# Patient Record
Sex: MALE | Race: WHITE | Employment: STUDENT | URBAN - NONMETROPOLITAN AREA
[De-identification: names, ages, dates, MRNs, and addresses within clinical notes are randomized per-mention and may not be internally consistent; named-entity substitution may affect disease eponyms.]

---

## 2024-08-18 ENCOUNTER — HOSPITAL ENCOUNTER (EMERGENCY)
Age: 24
Discharge: HOME | End: 2024-08-19
Attending: EMERGENCY MEDICINE
Payer: OTHER GOVERNMENT

## 2024-08-18 ENCOUNTER — APPOINTMENT (OUTPATIENT)
Dept: GENERAL RADIOLOGY | Age: 24
End: 2024-08-18
Payer: OTHER GOVERNMENT

## 2024-08-18 DIAGNOSIS — R55 NEAR SYNCOPE: ICD-10-CM

## 2024-08-18 DIAGNOSIS — R00.2 PALPITATIONS: Primary | ICD-10-CM

## 2024-08-18 PROCEDURE — 71045 X-RAY EXAM CHEST 1 VIEW: CPT

## 2024-08-18 PROCEDURE — 85379 FIBRIN DEGRADATION QUANT: CPT

## 2024-08-18 PROCEDURE — 80048 BASIC METABOLIC PNL TOTAL CA: CPT

## 2024-08-18 PROCEDURE — 36415 COLL VENOUS BLD VENIPUNCTURE: CPT

## 2024-08-18 PROCEDURE — 84484 ASSAY OF TROPONIN QUANT: CPT

## 2024-08-18 PROCEDURE — 93005 ELECTROCARDIOGRAM TRACING: CPT | Performed by: EMERGENCY MEDICINE

## 2024-08-18 PROCEDURE — 85025 COMPLETE CBC W/AUTO DIFF WBC: CPT

## 2024-08-18 PROCEDURE — 99285 EMERGENCY DEPT VISIT HI MDM: CPT

## 2024-08-18 PROCEDURE — 83735 ASSAY OF MAGNESIUM: CPT

## 2024-08-18 ASSESSMENT — PAIN SCALES - GENERAL: PAINLEVEL_OUTOF10: 3

## 2024-08-18 ASSESSMENT — PAIN - FUNCTIONAL ASSESSMENT: PAIN_FUNCTIONAL_ASSESSMENT: 0-10

## 2024-08-19 ENCOUNTER — APPOINTMENT (OUTPATIENT)
Age: 24
End: 2024-08-19
Attending: EMERGENCY MEDICINE
Payer: OTHER GOVERNMENT

## 2024-08-19 ENCOUNTER — TELEPHONE (OUTPATIENT)
Dept: FAMILY MEDICINE CLINIC | Age: 24
End: 2024-08-19

## 2024-08-19 ENCOUNTER — APPOINTMENT (OUTPATIENT)
Dept: CT IMAGING | Age: 24
End: 2024-08-19
Payer: OTHER GOVERNMENT

## 2024-08-19 VITALS
BODY MASS INDEX: 26.41 KG/M2 | SYSTOLIC BLOOD PRESSURE: 118 MMHG | HEART RATE: 70 BPM | TEMPERATURE: 98.2 F | DIASTOLIC BLOOD PRESSURE: 82 MMHG | HEIGHT: 72 IN | WEIGHT: 195 LBS | OXYGEN SATURATION: 99 % | RESPIRATION RATE: 15 BRPM

## 2024-08-19 LAB
ANION GAP SERPL CALC-SCNC: 12 MEQ/L (ref 8–16)
BASOPHILS ABSOLUTE: 0 THOU/MM3 (ref 0–0.1)
BASOPHILS NFR BLD AUTO: 0.6 %
BUN SERPL-MCNC: 9 MG/DL (ref 7–22)
CALCIUM SERPL-MCNC: 8.9 MG/DL (ref 8.5–10.5)
CHLORIDE SERPL-SCNC: 105 MEQ/L (ref 98–111)
CO2 SERPL-SCNC: 24 MEQ/L (ref 23–33)
CREAT SERPL-MCNC: 0.8 MG/DL (ref 0.4–1.2)
D DIMER PPP IA.FEU-MCNC: 907 NG/ML FEU (ref 0–500)
DEPRECATED RDW RBC AUTO: 38.2 FL (ref 35–45)
ECHO BSA: 2.12 M2
EKG ATRIAL RATE: 86 BPM
EKG P AXIS: 53 DEGREES
EKG P-R INTERVAL: 154 MS
EKG Q-T INTERVAL: 374 MS
EKG QRS DURATION: 100 MS
EKG QTC CALCULATION (BAZETT): 447 MS
EKG R AXIS: 84 DEGREES
EKG T AXIS: 49 DEGREES
EKG VENTRICULAR RATE: 86 BPM
EOSINOPHIL NFR BLD AUTO: 4.2 %
EOSINOPHILS ABSOLUTE: 0.3 THOU/MM3 (ref 0–0.4)
ERYTHROCYTE [DISTWIDTH] IN BLOOD BY AUTOMATED COUNT: 12.1 % (ref 11.5–14.5)
GFR SERPL CREATININE-BSD FRML MDRD: > 90 ML/MIN/1.73M2
GLUCOSE SERPL-MCNC: 107 MG/DL (ref 70–108)
HCT VFR BLD AUTO: 37.7 % (ref 42–52)
HGB BLD-MCNC: 13.3 GM/DL (ref 14–18)
IMM GRANULOCYTES # BLD AUTO: 0.01 THOU/MM3 (ref 0–0.07)
IMM GRANULOCYTES NFR BLD AUTO: 0.2 %
LYMPHOCYTES ABSOLUTE: 1.9 THOU/MM3 (ref 1–4.8)
LYMPHOCYTES NFR BLD AUTO: 29.8 %
MAGNESIUM SERPL-MCNC: 2 MG/DL (ref 1.6–2.4)
MCH RBC QN AUTO: 30.7 PG (ref 26–33)
MCHC RBC AUTO-ENTMCNC: 35.3 GM/DL (ref 32.2–35.5)
MCV RBC AUTO: 87.1 FL (ref 80–94)
MONOCYTES ABSOLUTE: 0.6 THOU/MM3 (ref 0.4–1.3)
MONOCYTES NFR BLD AUTO: 8.6 %
NEUTROPHILS ABSOLUTE: 3.6 THOU/MM3 (ref 1.8–7.7)
NEUTROPHILS NFR BLD AUTO: 56.6 %
NRBC BLD AUTO-RTO: 0 /100 WBC
OSMOLALITY SERPL CALC.SUM OF ELEC: 280.4 MOSMOL/KG (ref 275–300)
PLATELET # BLD AUTO: 320 THOU/MM3 (ref 130–400)
PMV BLD AUTO: 10.4 FL (ref 9.4–12.4)
POTASSIUM SERPL-SCNC: 3.6 MEQ/L (ref 3.5–5.2)
RBC # BLD AUTO: 4.33 MILL/MM3 (ref 4.7–6.1)
SODIUM SERPL-SCNC: 141 MEQ/L (ref 135–145)
TROPONIN, HIGH SENSITIVITY: < 6 NG/L (ref 0–12)
WBC # BLD AUTO: 6.4 THOU/MM3 (ref 4.8–10.8)

## 2024-08-19 PROCEDURE — 93242 EXT ECG>48HR<7D RECORDING: CPT

## 2024-08-19 PROCEDURE — 93010 ELECTROCARDIOGRAM REPORT: CPT | Performed by: INTERNAL MEDICINE

## 2024-08-19 PROCEDURE — 6360000004 HC RX CONTRAST MEDICATION: Performed by: EMERGENCY MEDICINE

## 2024-08-19 PROCEDURE — 71275 CT ANGIOGRAPHY CHEST: CPT

## 2024-08-19 RX ADMIN — IOPAMIDOL 80 ML: 755 INJECTION, SOLUTION INTRAVENOUS at 01:20

## 2024-08-19 NOTE — ED PROVIDER NOTES
I performed a history and physical examination of the patient and discussed management with the resident. I reviewed the resident’s note and agree with the documented findings and plan of care. Any areas of disagreement are noted on the chart. I was personally present for the key portions of any procedures. I have documented in the chart those procedures where I was not present during the key portions. I have reviewed the emergency nurses triage note. I agree with the chief complaint, past medical history, past surgical history, allergies, medications, social and family history as documented unless otherwise noted below. Documentation of the HPI, Physical Exam and Medical Decision Making performed by medical students or scribes is based on my personal performance of the HPI, PE and MDM. For Phys Assistant/ Nurse Practitioner cases/documentation I have personally evaluated this patient and have completed at least one if not all key elements of the E/M (history, physical exam, and MDM). My findings are as noted below.    In other words, I personally saw and examined the patient I have reviewed and agreed with the resident findings including all diagnostic interpretations and treatment plans as written.  I was present for the key portion of any procedures performed and the inclusive time noted in any critical care statement.    Patient presents today for tachycardia.  With possible near syncope.  Patient states that he has been having this on and off for approximately 3 months.  Patient states that he notes especially is when he sitting down that his heart will begin to race he will feel like he is going to pass out.  Patient states he has never had anything like this before.  Patient states he is drinking 3 to 4 cans of pop a day thought that might be part of the problem but only drank 1 today.  Patient states he is not taking any medications, he denies any power drinks or preworkout's.  Patient denies any overt chest 
(ISOVUE-370) 76 % injection 80 mL (80 mLs IntraVENous Given 8/19/24 0120)         PROCEDURES: (None if blank)  Procedures:     CRITICAL CARE: (None if blank)      DISCHARGE PRESCRIPTIONS: (None if blank)  Discharge Medication List as of 8/19/2024  2:51 AM          FINAL IMPRESSION      1. Palpitations    2. Near syncope          DISPOSITION/PLAN   DISPOSITION Decision To Discharge 08/19/2024 02:59:08 AM  Condition at Disposition: Data Unavailable      OUTPATIENT FOLLOW UP THE PATIENT:  Flavio Colvin DO  [address], [address]  [place] [address]  [phone]    In 2 days  As needed    . [facility] EMERGENCY DEPT  [address] [place] [address]  [phone]    If symptoms worsen    [facility]  [address] [place] [address]  [phone]  In 3 days  As needed      Eddie Motley DO    This transcription was electronically signed. Parts of this transcriptions may have been dictated by use of voice recognition software and electronically transcribed, and parts may have been transcribed with the assistance of an ED scribe. The transcription may contain errors not detected in proofreading.  Please refer to my supervising physician's documentation if my documentation differs.    Electronically Signed: Eddie Motley DO, 08/19/24, 3:07 AM

## 2024-08-19 NOTE — ED TRIAGE NOTES
Pt presents to the ED via EMS with complaints of a near syncopal episode. Pt states that he was sitting in his room, scrolling on his phone when he started to get tunnel vision and \"sink into the bed.\" Pt states he then snapped out of it and felt like his heart was racing. Pt states that he then went downstairs to alert his parent. Upon arrival pt states that his symptoms have somewhat resolved, but that he still has some chest pressure that he's rating a 3/10.

## 2024-08-19 NOTE — DISCHARGE INSTRUCTIONS
You were seen here today for nearly passing out. Make sure to call and set up an appointment with the cardiologist to review your holter monitor results. Return here if you develop chest pain or difficulty breathing.

## 2024-08-20 ENCOUNTER — OFFICE VISIT (OUTPATIENT)
Dept: CARDIOLOGY CLINIC | Age: 24
End: 2024-08-20
Payer: OTHER GOVERNMENT

## 2024-08-20 VITALS
HEART RATE: 71 BPM | BODY MASS INDEX: 26.82 KG/M2 | DIASTOLIC BLOOD PRESSURE: 72 MMHG | WEIGHT: 198 LBS | HEIGHT: 72 IN | SYSTOLIC BLOOD PRESSURE: 108 MMHG

## 2024-08-20 DIAGNOSIS — R94.31 ABNORMAL ELECTROCARDIOGRAM (ECG) (EKG): ICD-10-CM

## 2024-08-20 DIAGNOSIS — R07.9 CHEST PAIN OF UNCERTAIN ETIOLOGY: ICD-10-CM

## 2024-08-20 DIAGNOSIS — R55 NEAR SYNCOPE: ICD-10-CM

## 2024-08-20 DIAGNOSIS — R00.0 TACHYCARDIA: Primary | ICD-10-CM

## 2024-08-20 PROCEDURE — 99204 OFFICE O/P NEW MOD 45 MIN: CPT | Performed by: INTERNAL MEDICINE

## 2024-08-20 PROCEDURE — 93000 ELECTROCARDIOGRAM COMPLETE: CPT | Performed by: INTERNAL MEDICINE

## 2024-08-20 NOTE — PROGRESS NOTES
No results found for: \"TSH\", \"TSHFT4\", \"TSHELE\", \"ROA0AEA\", \"TSHHS\"     BNP: No results found for: \"BNP\"    EKG:   Sinus Rhythm -With rate variation   Rate 71  Possible LVH  IRBBB  NS ST-T abnormalities  ABNORMAL ECG      Cardiac Imaging:    ECHO: No results found for this or any previous visit.    Stress Nicole: No results found for this or any previous visit.    Stress Exercise: No results found for this or any previous visit.    Cardiac Monitor: No results found for this or any previous visit.    Cath: No results found for this or any previous visit.      Chest Xray: 8/18/2024  IMPRESSION:  1. No acute findings.    CTA Heart: No results found for this or any previous visit.    CTA of Chest W/WO contrast:8/19/2024  Final Result   1. No pulmonary embolism or other acute finding in the chest.        ASSESSMENT/PLAN:      1. Tachycardia  Episodes of tachycardia.  Cardiac monitors in place.  Echocardiogram is ordered to assess LV size and function.  Labs from ER reviewed.    2. Near syncope  Episode of near syncope.  Cardiac monitor is in place.  Echocardiogram is ordered to assess LV size and function.    3. Chest pain of uncertain etiology  Chest pain of uncertain etiology.  Exercise stress test is ordered to assess for ischemia.    4. Abnormal electrocardiogram (ECG) (EKG)  Mildly abnormal ECG.  Exercise stress test is ordered to assess for ischemia.      Kin Madsen MD  5:10 PM  8/20/2024

## 2024-08-29 ENCOUNTER — HOSPITAL ENCOUNTER (OUTPATIENT)
Age: 24
Discharge: HOME | End: 2024-08-31
Attending: INTERNAL MEDICINE
Payer: OTHER GOVERNMENT

## 2024-08-29 VITALS — WEIGHT: 190 LBS | BODY MASS INDEX: 25.73 KG/M2 | HEIGHT: 72 IN

## 2024-08-29 DIAGNOSIS — R94.31 ABNORMAL ELECTROCARDIOGRAM (ECG) (EKG): ICD-10-CM

## 2024-08-29 DIAGNOSIS — R00.0 TACHYCARDIA: ICD-10-CM

## 2024-08-29 DIAGNOSIS — R07.9 CHEST PAIN OF UNCERTAIN ETIOLOGY: ICD-10-CM

## 2024-08-29 DIAGNOSIS — R55 NEAR SYNCOPE: ICD-10-CM

## 2024-08-29 LAB
ECHO AO ROOT DIAM: 2.9 CM
ECHO AV CUSP MM: 2.1 CM
ECHO AV MEAN GRADIENT: 4 MMHG
ECHO AV MEAN VELOCITY: 9.1 M/S
ECHO AV PEAK GRADIENT: 7 MMHG
ECHO AV PEAK VELOCITY: 13.3 M/S
ECHO AV VELOCITY RATIO: 0.8
ECHO AV VTI: 29.7 CM
ECHO BSA: 2.09 M2
ECHO EST RA PRESSURE: 8 MMHG
ECHO LA DIAMETER: 3.3 CM
ECHO LA TO AORTIC ROOT RATIO: 1.14
ECHO LA VOL A-L A2C: 30 ML (ref 18–58)
ECHO LA VOL A-L A4C: 22 ML (ref 18–58)
ECHO LV E' LATERAL VELOCITY: 21 CM/S
ECHO LV E' SEPTAL VELOCITY: 13 CM/S
ECHO LV EDV A2C: 128 ML
ECHO LV EDV A4C: 151 ML
ECHO LV EDV BP: 140 ML (ref 67–155)
ECHO LV EDV TEICHHOLZ: 124 ML
ECHO LV EF PHYSICIAN: 60 %
ECHO LV EJECTION FRACTION A2C: 62 %
ECHO LV EJECTION FRACTION A4C: 63 %
ECHO LV EJECTION FRACTION BIPLANE: 61 % (ref 55–100)
ECHO LV ESV A2C: 49 ML
ECHO LV ESV A4C: 56 ML
ECHO LV ESV BP: 54 ML (ref 22–58)
ECHO LV ESV TEICHHOLZ: 47 ML
ECHO LV FRACTIONAL SHORTENING: 33 % (ref 28–44)
ECHO LV INTERNAL DIMENSION DIASTOLIC: 5.1 CM (ref 4.2–5.9)
ECHO LV INTERNAL DIMENSION SYSTOLIC: 3.4 CM
ECHO LV IVSD: 0.8 CM (ref 0.6–1)
ECHO LV MASS 2D: 151.8 G (ref 88–224)
ECHO LV POSTERIOR WALL DIASTOLIC: 0.9 CM (ref 0.6–1)
ECHO LV RELATIVE WALL THICKNESS RATIO: 0.35
ECHO LVOT AV VTI INDEX: 0.78
ECHO LVOT MEAN GRADIENT: 2 MMHG
ECHO LVOT PEAK GRADIENT: 4 MMHG
ECHO LVOT PEAK VELOCITY: 10.6 M/S
ECHO LVOT VTI: 23.3 CM
ECHO MV A VELOCITY: 0.65 M/S
ECHO MV E VELOCITY: 0.89 M/S
ECHO MV E/A RATIO: 1.37
ECHO MV E/E' LATERAL: 4.24
ECHO MV E/E' RATIO (AVERAGED): 5.54
ECHO MV E/E' SEPTAL: 6.85
ECHO PV MAX VELOCITY: 0.7 M/S
ECHO PV PEAK GRADIENT: 2 MMHG
ECHO RIGHT VENTRICULAR SYSTOLIC PRESSURE (RVSP): 34 MMHG
ECHO RV INTERNAL DIMENSION: 2.9 CM
ECHO RV TAPSE: 2.4 CM (ref 1.7–?)
ECHO TV REGURGITANT MAX VELOCITY: 2.54 M/S
ECHO TV REGURGITANT PEAK GRADIENT: 26 MMHG
STRESS BASELINE DIAS BP: 85 MMHG
STRESS BASELINE HR: 55 BPM
STRESS BASELINE SYS BP: 126 MMHG
STRESS EXERCISE DUR MIN: 12 MIN
STRESS STAGE 1 BP: NORMAL MMHG
STRESS STAGE 1 DURATION: 3 MIN:SEC
STRESS STAGE 1 HR: 98 BPM
STRESS STAGE 2 BP: NORMAL MMHG
STRESS STAGE 2 DURATION: 3 MIN:SEC
STRESS STAGE 2 HR: 124 BPM
STRESS STAGE 3 BP: NORMAL MMHG
STRESS STAGE 3 DURATION: 3 MIN:SEC
STRESS STAGE 3 HR: 155 BPM
STRESS STAGE 4 DURATION: 3 MIN:SEC
STRESS STAGE 4 HR: 185 BPM
STRESS STAGE RECOVERY 1 BP: NORMAL MMHG
STRESS STAGE RECOVERY 1 DURATION: 0 MIN:SEC
STRESS STAGE RECOVERY 1 HR: 173 BPM
STRESS STAGE RECOVERY 2 BP: NORMAL MMHG
STRESS STAGE RECOVERY 2 DURATION: 2 MIN:SEC
STRESS STAGE RECOVERY 2 HR: 115 BPM
STRESS STAGE RECOVERY 3 BP: NORMAL MMHG
STRESS STAGE RECOVERY 3 DURATION: 5 MIN:SEC
STRESS STAGE RECOVERY 3 HR: 91 BPM
STRESS TARGET HR: 198 BPM

## 2024-08-29 PROCEDURE — 93016 CV STRESS TEST SUPVJ ONLY: CPT | Performed by: INTERNAL MEDICINE

## 2024-08-29 PROCEDURE — 93017 CV STRESS TEST TRACING ONLY: CPT

## 2024-08-29 PROCEDURE — 93018 CV STRESS TEST I&R ONLY: CPT | Performed by: INTERNAL MEDICINE

## 2024-08-29 PROCEDURE — 93306 TTE W/DOPPLER COMPLETE: CPT

## 2024-08-31 LAB — ECHO BSA: 2.12 M2

## 2024-09-02 LAB — ECHO BSA: 2.12 M2

## 2024-09-10 ENCOUNTER — TELEPHONE (OUTPATIENT)
Dept: CARDIOLOGY CLINIC | Age: 24
End: 2024-09-10

## 2024-11-27 ENCOUNTER — TELEPHONE (OUTPATIENT)
Dept: CARDIOLOGY CLINIC | Age: 24
End: 2024-11-27

## 2024-11-27 NOTE — TELEPHONE ENCOUNTER
Received forms from [facility] in the fax, patient needs clearance to start donating at [facility] due to RBBB and irregular heartbeat. Patient had an echo and stress test done on 8-29-24. Patients 9-13-24 follow up to go over results was cancelled.    Called patient to schedule an appmt, pt states his follow up was cancelled because Dr. Madsen told him there was no need to be seen since all testing came back normal. Pt prefers for us to send a message before scheduling an appmt.     Is the patient cleared to donate at [facility]? Forms are in box.     F: [phone]

## 2024-12-02 NOTE — TELEPHONE ENCOUNTER
What on earth is [employer] clearance????  No cardiac contraindication to donating.  It is up to their rules and guidelines.

## 2025-05-19 ENCOUNTER — HOSPITAL ENCOUNTER (EMERGENCY)
Age: 25
Discharge: HOME | End: 2025-05-19
Attending: EMERGENCY MEDICINE
Payer: OTHER GOVERNMENT

## 2025-05-19 ENCOUNTER — APPOINTMENT (OUTPATIENT)
Dept: GENERAL RADIOLOGY | Age: 25
End: 2025-05-19
Payer: OTHER GOVERNMENT

## 2025-05-19 VITALS
HEIGHT: 72 IN | HEART RATE: 60 BPM | RESPIRATION RATE: 19 BRPM | SYSTOLIC BLOOD PRESSURE: 116 MMHG | TEMPERATURE: 97.8 F | BODY MASS INDEX: 27.09 KG/M2 | WEIGHT: 200 LBS | OXYGEN SATURATION: 98 % | DIASTOLIC BLOOD PRESSURE: 67 MMHG

## 2025-05-19 DIAGNOSIS — R10.13 DYSPEPSIA: Primary | ICD-10-CM

## 2025-05-19 LAB
ANION GAP SERPL CALC-SCNC: 14 MEQ/L (ref 8–16)
BASOPHILS ABSOLUTE: 0 THOU/MM3 (ref 0–0.1)
BASOPHILS NFR BLD AUTO: 0.4 %
BUN SERPL-MCNC: 16 MG/DL (ref 8–23)
CALCIUM SERPL-MCNC: 9.6 MG/DL (ref 8.6–10)
CHLORIDE SERPL-SCNC: 103 MEQ/L (ref 98–111)
CO2 SERPL-SCNC: 23 MEQ/L (ref 22–29)
CREAT SERPL-MCNC: 0.9 MG/DL (ref 0.7–1.2)
DEPRECATED RDW RBC AUTO: 39.4 FL (ref 35–45)
EOSINOPHIL NFR BLD AUTO: 3.8 %
EOSINOPHILS ABSOLUTE: 0.3 THOU/MM3 (ref 0–0.4)
ERYTHROCYTE [DISTWIDTH] IN BLOOD BY AUTOMATED COUNT: 12.3 % (ref 11.5–14.5)
FLUAV RNA RESP QL NAA+PROBE: NOT DETECTED
FLUBV RNA RESP QL NAA+PROBE: NOT DETECTED
GFR SERPL CREATININE-BSD FRML MDRD: > 90 ML/MIN/1.73M2
GLUCOSE SERPL-MCNC: 100 MG/DL (ref 74–109)
HCT VFR BLD AUTO: 41.2 % (ref 42–52)
HGB BLD-MCNC: 14.1 GM/DL (ref 14–18)
IMM GRANULOCYTES # BLD AUTO: 0.01 THOU/MM3 (ref 0–0.07)
IMM GRANULOCYTES NFR BLD AUTO: 0.1 %
LIPASE SERPL-CCNC: 34 U/L (ref 13–60)
LYMPHOCYTES ABSOLUTE: 2.2 THOU/MM3 (ref 1–4.8)
LYMPHOCYTES NFR BLD AUTO: 27.4 %
MCH RBC QN AUTO: 29.7 PG (ref 26–33)
MCHC RBC AUTO-ENTMCNC: 34.2 GM/DL (ref 32.2–35.5)
MCV RBC AUTO: 86.9 FL (ref 80–94)
MONOCYTES ABSOLUTE: 0.6 THOU/MM3 (ref 0.4–1.3)
MONOCYTES NFR BLD AUTO: 7.7 %
NEUTROPHILS ABSOLUTE: 5 THOU/MM3 (ref 1.8–7.7)
NEUTROPHILS NFR BLD AUTO: 60.6 %
NRBC BLD AUTO-RTO: 0 /100 WBC
OSMOLALITY SERPL CALC.SUM OF ELEC: 280.7 MOSMOL/KG (ref 275–300)
PLATELET # BLD AUTO: 312 THOU/MM3 (ref 130–400)
PMV BLD AUTO: 10.5 FL (ref 9.4–12.4)
POTASSIUM SERPL-SCNC: 3.9 MEQ/L (ref 3.5–5.2)
RBC # BLD AUTO: 4.74 MILL/MM3 (ref 4.7–6.1)
SARS-COV-2 RNA RESP QL NAA+PROBE: NOT DETECTED
SODIUM SERPL-SCNC: 140 MEQ/L (ref 135–145)
TROPONIN, HIGH SENSITIVITY: < 6 NG/L (ref 0–12)
WBC # BLD AUTO: 8.2 THOU/MM3 (ref 4.8–10.8)

## 2025-05-19 PROCEDURE — 2580000003 HC RX 258: Performed by: EMERGENCY MEDICINE

## 2025-05-19 PROCEDURE — 99285 EMERGENCY DEPT VISIT HI MDM: CPT

## 2025-05-19 PROCEDURE — 87636 SARSCOV2 & INF A&B AMP PRB: CPT

## 2025-05-19 PROCEDURE — 96374 THER/PROPH/DIAG INJ IV PUSH: CPT

## 2025-05-19 PROCEDURE — 36415 COLL VENOUS BLD VENIPUNCTURE: CPT

## 2025-05-19 PROCEDURE — 84484 ASSAY OF TROPONIN QUANT: CPT

## 2025-05-19 PROCEDURE — 6360000002 HC RX W HCPCS: Performed by: EMERGENCY MEDICINE

## 2025-05-19 PROCEDURE — 71046 X-RAY EXAM CHEST 2 VIEWS: CPT

## 2025-05-19 PROCEDURE — 96372 THER/PROPH/DIAG INJ SC/IM: CPT

## 2025-05-19 PROCEDURE — 85025 COMPLETE CBC W/AUTO DIFF WBC: CPT

## 2025-05-19 PROCEDURE — 83690 ASSAY OF LIPASE: CPT

## 2025-05-19 PROCEDURE — 93005 ELECTROCARDIOGRAM TRACING: CPT | Performed by: EMERGENCY MEDICINE

## 2025-05-19 PROCEDURE — 80048 BASIC METABOLIC PNL TOTAL CA: CPT

## 2025-05-19 PROCEDURE — 6370000000 HC RX 637 (ALT 250 FOR IP): Performed by: EMERGENCY MEDICINE

## 2025-05-19 RX ORDER — MAGNESIUM HYDROXIDE/ALUMINUM HYDROXICE/SIMETHICONE 120; 1200; 1200 MG/30ML; MG/30ML; MG/30ML
30 SUSPENSION ORAL ONCE
Status: COMPLETED | OUTPATIENT
Start: 2025-05-19 | End: 2025-05-19

## 2025-05-19 RX ORDER — 0.9 % SODIUM CHLORIDE 0.9 %
1000 INTRAVENOUS SOLUTION INTRAVENOUS ONCE
Status: COMPLETED | OUTPATIENT
Start: 2025-05-19 | End: 2025-05-19

## 2025-05-19 RX ORDER — DICYCLOMINE HYDROCHLORIDE 10 MG/ML
20 INJECTION INTRAMUSCULAR ONCE
Status: COMPLETED | OUTPATIENT
Start: 2025-05-19 | End: 2025-05-19

## 2025-05-19 RX ORDER — PANTOPRAZOLE SODIUM 20 MG/1
20 TABLET, DELAYED RELEASE ORAL DAILY
Qty: 30 TABLET | Refills: 0 | Status: SHIPPED | OUTPATIENT
Start: 2025-05-19

## 2025-05-19 RX ORDER — MORPHINE SULFATE 4 MG/ML
4 INJECTION, SOLUTION INTRAMUSCULAR; INTRAVENOUS ONCE
Status: DISCONTINUED | OUTPATIENT
Start: 2025-05-19 | End: 2025-05-19 | Stop reason: HOSPADM

## 2025-05-19 RX ADMIN — DICYCLOMINE HYDROCHLORIDE 20 MG: 10 INJECTION, SOLUTION INTRAMUSCULAR at 17:40

## 2025-05-19 RX ADMIN — ALUMINUM HYDROXIDE, MAGNESIUM HYDROXIDE, AND SIMETHICONE 30 ML: 200; 200; 20 SUSPENSION ORAL at 17:42

## 2025-05-19 RX ADMIN — FAMOTIDINE 20 MG: 10 INJECTION, SOLUTION INTRAVENOUS at 17:38

## 2025-05-19 RX ADMIN — SODIUM CHLORIDE 1000 ML: 0.9 INJECTION, SOLUTION INTRAVENOUS at 18:10

## 2025-05-19 ASSESSMENT — PAIN - FUNCTIONAL ASSESSMENT: PAIN_FUNCTIONAL_ASSESSMENT: 0-10

## 2025-05-19 ASSESSMENT — PAIN SCALES - GENERAL
PAINLEVEL_OUTOF10: 1
PAINLEVEL_OUTOF10: 3

## 2025-05-19 NOTE — ED TRIAGE NOTES
Patient presents to ED via intake with multiple complaints. Patient states he is having chest soreness/epigastric soreness, shortness of breath, headaches, and allergy symptoms/congestion. Patient reports being diagnosed with RBBB last time he was at our ER. [facility] but he didn't really take it. Patient denies taking anything else for symptoms. VSS. EKG completed in triage. IV established and blood work sent. Patient placed on telemetry.

## 2025-05-19 NOTE — ED PROVIDER NOTES
[facility] EMERGENCY DEPARTMENT  EMERGENCY DEPARTMENT ENCOUNTER          Pt Name: Stef Mendez III  MRN: [id number]  Birthdate 2000  Date of evaluation: 5/19/2025  Physician: Tang Rubi MD, FACEP, FAAEM, FAWM      CHIEF COMPLAINT       Chief Complaint   Patient presents with    Chest Pain    Headache    Shortness of Breath         HISTORY OF PRESENT ILLNESS    HPI  Stef Mendez III is a 25 y.o. male who presents to the emergency department from home, as a walk in to the ED lobby for evaluation of 2 weeks epigastric abdominal pain that is unchanged with fluid, watery diarrhea 2 or 3 times a day.  In addition to this patient states that for a long time he has been having malaise and bodyaches without fever.  The patient has no other acute complaints at this time.      PAST MEDICAL AND SURGICAL HISTORY   History reviewed. No pertinent past medical history.  Past Surgical History:   Procedure Laterality Date    INCOMPLETE CIRCUMCISION REPAIR           MEDICATIONS     Current Facility-Administered Medications:     morphine injection 4 mg, 4 mg, IntraVENous, Once, Tang Rubi MD    sodium chloride 0.9 % bolus 1,000 mL, 1,000 mL, IntraVENous, Once, Tang Rubi MD, Last Rate: 495.9 mL/hr at 05/19/25 1810, 1,000 mL at 05/19/25 1810    Current Outpatient Medications:     pantoprazole (PROTONIX) 20 MG tablet, Take 1 tablet by mouth daily, Disp: 30 tablet, Rfl: 0    Magic Mouthwash (MIRACLE MOUTHWASH), Swish and swallow 5 mLs 4 times daily as needed for Irritation 50 ml 2% Lidocaine, 50 ml Maalox, 50 ml Benadryl 12.5/5 ml (Patient not taking: Reported on 5/19/2025), Disp: 150 mL, Rfl: 0    Previous Medications    MAGIC MOUTHWASH (MIRACLE MOUTHWASH)    Swish and swallow 5 mLs 4 times daily as needed for Irritation 50 ml 2% Lidocaine, 50 ml Maalox, 50 ml Benadryl 12.5/5 ml         SOCIAL HISTORY     Social History     Social History Narrative    Not on file     Social History

## 2025-05-20 LAB
EKG ATRIAL RATE: 71 BPM
EKG P AXIS: 65 DEGREES
EKG P-R INTERVAL: 152 MS
EKG Q-T INTERVAL: 396 MS
EKG QRS DURATION: 108 MS
EKG QTC CALCULATION (BAZETT): 430 MS
EKG R AXIS: 91 DEGREES
EKG T AXIS: 56 DEGREES
EKG VENTRICULAR RATE: 71 BPM

## 2025-05-20 PROCEDURE — 93010 ELECTROCARDIOGRAM REPORT: CPT | Performed by: INTERNAL MEDICINE
